# Patient Record
Sex: MALE | Race: WHITE | Employment: FULL TIME | URBAN - METROPOLITAN AREA
[De-identification: names, ages, dates, MRNs, and addresses within clinical notes are randomized per-mention and may not be internally consistent; named-entity substitution may affect disease eponyms.]

---

## 2018-08-15 ENCOUNTER — MED REC SCAN ONLY (OUTPATIENT)
Dept: INTERNAL MEDICINE CLINIC | Facility: CLINIC | Age: 68
End: 2018-08-15

## 2021-04-21 PROBLEM — I49.01 VENTRICULAR FIBRILLATION (HCC): Status: RESOLVED | Noted: 2021-04-21 | Resolved: 2021-04-21

## 2021-04-21 PROBLEM — I49.01 VENTRICULAR FIBRILLATION (HCC): Status: ACTIVE | Noted: 2021-04-21

## 2021-04-21 PROBLEM — I10 BENIGN ESSENTIAL HTN: Status: ACTIVE | Noted: 2021-04-21

## 2021-10-12 ENCOUNTER — HOSPITAL ENCOUNTER (INPATIENT)
Facility: HOSPITAL | Age: 71
LOS: 10 days | Discharge: HOME HEALTH | DRG: 177 | End: 2021-10-22
Attending: EMERGENCY MEDICINE | Admitting: INTERNAL MEDICINE
Payer: MEDICARE

## 2021-10-12 ENCOUNTER — APPOINTMENT (OUTPATIENT)
Dept: GENERAL RADIOLOGY | Facility: HOSPITAL | Age: 71
DRG: 177 | End: 2021-10-12
Attending: EMERGENCY MEDICINE
Payer: MEDICARE

## 2021-10-12 ENCOUNTER — APPOINTMENT (OUTPATIENT)
Dept: CT IMAGING | Facility: HOSPITAL | Age: 71
DRG: 177 | End: 2021-10-12
Attending: EMERGENCY MEDICINE
Payer: MEDICARE

## 2021-10-12 DIAGNOSIS — R77.8 ELEVATED TROPONIN I LEVEL: ICD-10-CM

## 2021-10-12 DIAGNOSIS — J12.82 PNEUMONIA DUE TO COVID-19 VIRUS: Primary | ICD-10-CM

## 2021-10-12 DIAGNOSIS — R09.02 HYPOXIA: ICD-10-CM

## 2021-10-12 DIAGNOSIS — U07.1 PNEUMONIA DUE TO COVID-19 VIRUS: Primary | ICD-10-CM

## 2021-10-12 DIAGNOSIS — E87.6 HYPOKALEMIA: ICD-10-CM

## 2021-10-12 DIAGNOSIS — E11.9 NEW ONSET TYPE 2 DIABETES MELLITUS (HCC): ICD-10-CM

## 2021-10-12 DIAGNOSIS — E87.1 HYPONATREMIA: ICD-10-CM

## 2021-10-12 PROCEDURE — 81001 URINALYSIS AUTO W/SCOPE: CPT | Performed by: EMERGENCY MEDICINE

## 2021-10-12 PROCEDURE — 87040 BLOOD CULTURE FOR BACTERIA: CPT | Performed by: EMERGENCY MEDICINE

## 2021-10-12 PROCEDURE — 71275 CT ANGIOGRAPHY CHEST: CPT | Performed by: EMERGENCY MEDICINE

## 2021-10-12 PROCEDURE — 83520 IMMUNOASSAY QUANT NOS NONAB: CPT | Performed by: INTERNAL MEDICINE

## 2021-10-12 PROCEDURE — 85025 COMPLETE CBC W/AUTO DIFF WBC: CPT | Performed by: EMERGENCY MEDICINE

## 2021-10-12 PROCEDURE — 96375 TX/PRO/DX INJ NEW DRUG ADDON: CPT

## 2021-10-12 PROCEDURE — 94640 AIRWAY INHALATION TREATMENT: CPT

## 2021-10-12 PROCEDURE — 99285 EMERGENCY DEPT VISIT HI MDM: CPT

## 2021-10-12 PROCEDURE — 82550 ASSAY OF CK (CPK): CPT | Performed by: EMERGENCY MEDICINE

## 2021-10-12 PROCEDURE — 84145 PROCALCITONIN (PCT): CPT | Performed by: EMERGENCY MEDICINE

## 2021-10-12 PROCEDURE — 83615 LACTATE (LD) (LDH) ENZYME: CPT | Performed by: EMERGENCY MEDICINE

## 2021-10-12 PROCEDURE — 93005 ELECTROCARDIOGRAM TRACING: CPT

## 2021-10-12 PROCEDURE — 84484 ASSAY OF TROPONIN QUANT: CPT | Performed by: EMERGENCY MEDICINE

## 2021-10-12 PROCEDURE — 85379 FIBRIN DEGRADATION QUANT: CPT | Performed by: EMERGENCY MEDICINE

## 2021-10-12 PROCEDURE — 71045 X-RAY EXAM CHEST 1 VIEW: CPT | Performed by: EMERGENCY MEDICINE

## 2021-10-12 PROCEDURE — 87205 SMEAR GRAM STAIN: CPT | Performed by: EMERGENCY MEDICINE

## 2021-10-12 PROCEDURE — 86140 C-REACTIVE PROTEIN: CPT | Performed by: EMERGENCY MEDICINE

## 2021-10-12 PROCEDURE — 87493 C DIFF AMPLIFIED PROBE: CPT | Performed by: INTERNAL MEDICINE

## 2021-10-12 PROCEDURE — 3E0333Z INTRODUCTION OF ANTI-INFLAMMATORY INTO PERIPHERAL VEIN, PERCUTANEOUS APPROACH: ICD-10-PCS | Performed by: INTERNAL MEDICINE

## 2021-10-12 PROCEDURE — 93010 ELECTROCARDIOGRAM REPORT: CPT

## 2021-10-12 PROCEDURE — 83880 ASSAY OF NATRIURETIC PEPTIDE: CPT | Performed by: EMERGENCY MEDICINE

## 2021-10-12 PROCEDURE — 96365 THER/PROPH/DIAG IV INF INIT: CPT

## 2021-10-12 PROCEDURE — 36415 COLL VENOUS BLD VENIPUNCTURE: CPT

## 2021-10-12 PROCEDURE — XW033E5 INTRODUCTION OF REMDESIVIR ANTI-INFECTIVE INTO PERIPHERAL VEIN, PERCUTANEOUS APPROACH, NEW TECHNOLOGY GROUP 5: ICD-10-PCS | Performed by: INTERNAL MEDICINE

## 2021-10-12 PROCEDURE — 82728 ASSAY OF FERRITIN: CPT | Performed by: EMERGENCY MEDICINE

## 2021-10-12 PROCEDURE — 80053 COMPREHEN METABOLIC PANEL: CPT | Performed by: EMERGENCY MEDICINE

## 2021-10-12 PROCEDURE — 83036 HEMOGLOBIN GLYCOSYLATED A1C: CPT | Performed by: INTERNAL MEDICINE

## 2021-10-12 RX ORDER — GUAIFENESIN 600 MG
600 TABLET, EXTENDED RELEASE 12 HR ORAL 2 TIMES DAILY
Status: DISCONTINUED | OUTPATIENT
Start: 2021-10-12 | End: 2021-10-12

## 2021-10-12 RX ORDER — ACETAMINOPHEN 325 MG/1
650 TABLET ORAL EVERY 6 HOURS PRN
Status: DISCONTINUED | OUTPATIENT
Start: 2021-10-12 | End: 2021-10-22

## 2021-10-12 RX ORDER — HEPARIN SODIUM 5000 [USP'U]/ML
5000 INJECTION, SOLUTION INTRAVENOUS; SUBCUTANEOUS EVERY 8 HOURS SCHEDULED
Status: DISCONTINUED | OUTPATIENT
Start: 2021-10-12 | End: 2021-10-16

## 2021-10-12 RX ORDER — POTASSIUM CHLORIDE 20 MEQ/1
40 TABLET, EXTENDED RELEASE ORAL ONCE
Status: COMPLETED | OUTPATIENT
Start: 2021-10-12 | End: 2021-10-12

## 2021-10-12 RX ORDER — ALBUTEROL SULFATE 90 UG/1
4 AEROSOL, METERED RESPIRATORY (INHALATION) EVERY 4 HOURS PRN
Status: DISCONTINUED | OUTPATIENT
Start: 2021-10-12 | End: 2021-10-22

## 2021-10-12 RX ORDER — ATORVASTATIN CALCIUM 80 MG/1
80 TABLET, FILM COATED ORAL NIGHTLY
Status: DISCONTINUED | OUTPATIENT
Start: 2021-10-12 | End: 2021-10-22

## 2021-10-12 RX ORDER — ATORVASTATIN CALCIUM 80 MG/1
80 TABLET, FILM COATED ORAL NIGHTLY
COMMUNITY
Start: 2021-09-24

## 2021-10-12 RX ORDER — ASPIRIN 81 MG/1
81 TABLET ORAL DAILY
Status: DISCONTINUED | OUTPATIENT
Start: 2021-10-12 | End: 2021-10-22

## 2021-10-12 RX ORDER — ALBUTEROL SULFATE 90 UG/1
8 AEROSOL, METERED RESPIRATORY (INHALATION) 4 TIMES DAILY
Status: DISCONTINUED | OUTPATIENT
Start: 2021-10-12 | End: 2021-10-22

## 2021-10-12 RX ORDER — LISINOPRIL 5 MG/1
5 TABLET ORAL DAILY
Status: DISCONTINUED | OUTPATIENT
Start: 2021-10-12 | End: 2021-10-22

## 2021-10-12 RX ORDER — CITALOPRAM 40 MG/1
40 TABLET ORAL DAILY
COMMUNITY
Start: 2021-09-24 | End: 2022-01-11

## 2021-10-12 RX ORDER — LISINOPRIL 5 MG/1
5 TABLET ORAL DAILY
COMMUNITY
Start: 2021-09-14 | End: 2022-01-11

## 2021-10-12 RX ORDER — GUAIFENESIN 600 MG
600 TABLET, EXTENDED RELEASE 12 HR ORAL 2 TIMES DAILY
Status: DISCONTINUED | OUTPATIENT
Start: 2021-10-12 | End: 2021-10-22

## 2021-10-12 RX ORDER — ESCITALOPRAM OXALATE 20 MG/1
20 TABLET ORAL DAILY
Status: DISCONTINUED | OUTPATIENT
Start: 2021-10-12 | End: 2021-10-22

## 2021-10-12 RX ORDER — ALBUTEROL SULFATE 90 UG/1
4 AEROSOL, METERED RESPIRATORY (INHALATION) EVERY 4 HOURS PRN
Status: DISCONTINUED | OUTPATIENT
Start: 2021-10-12 | End: 2021-10-12

## 2021-10-12 RX ORDER — DEXAMETHASONE SODIUM PHOSPHATE 10 MG/ML
10 INJECTION, SOLUTION INTRAMUSCULAR; INTRAVENOUS ONCE
Status: COMPLETED | OUTPATIENT
Start: 2021-10-12 | End: 2021-10-12

## 2021-10-12 NOTE — ED INITIAL ASSESSMENT (HPI)
Patient with cough, fever and SOB started 11 days ago. Patient is unvaccinated and not covid tested prior to coming to ER.

## 2021-10-12 NOTE — ED QUICK NOTES
Orders for admission, patient is aware of plan and ready to go upstairs. Any questions, please call ED RN Sera Parks at [phone]    Vaccinated?  NO  COVID POSITIVE      Titratable drug(s) infusing: None  Rate: Remdesevir @250, may be done by the time he's ready to g

## 2021-10-12 NOTE — H&P
General Medicine H&P     Patient presents with:  Covid       PCP: Arelis Solomon MD    History of Present Illness: Patient is a 79year old male with PMH including but not limited to HTN, CAD, HL who p/t [facility] ED c sob AND [id number].      Pt reports having sxs fo NT/ND, Bowel sounds normal. No masses,  No organomegaly. Extremities/MSK: Extremities normal/normal movement, atraumatic, no cyanosis  or edema. Skin: Skin color, texture, turgor normal. No rashes or lesions.     Neurologic: Moving all extremities spon Saskia Tatum MD on 10/12/2021 at 12:49 PM     Finalized by (CST): Saskia Tatum MD on 10/12/2021 at 12:50 PM            ASSESSMENT / PLAN:    79year old male with PMH including but not limited to HTN, CAD, HL who p/t [facility] ED c sob AND [id number].      # Acut

## 2021-10-12 NOTE — ED QUICK NOTES
Spoke with Korin Chau from [employer]. Pt iv in right arm not flushing. Korin Chau placed second iv in left arm. RN notified.

## 2021-10-12 NOTE — CONSULTS
INFECTIOUS DISEASE CONSULTATION    Leonardo Day Patient Status:  Emergency    10/30/1950 MRN [id number]   Location [facility] Attending Alber Sousa MD   Hosp Day # 0 PCP tablet, Rfl: 0  LISINOPRIL 5 MG Oral Tab, Take 1 tablet by mouth once daily, Disp: 90 tablet, Rfl: 0  BRILINTA 90 MG Oral Tab, Take 1 tablet (90 mg total) by mouth 2 (two) times daily. , Disp: 180 tablet, Rfl: 3  Ergocalciferol (VITAMIN D2 OR), Take 1-2 cap Creatinine Kinase  Recent Labs   Lab 10/12/21  1255          Inflammatory Markers  Recent Labs   Lab 10/12/21  1255   CRP 15.30*   ZACK 4,196.7*   *   DDIMER 1.71*           Lab Results   Component Value Date    DDIMER 1.71 10/12/2021

## 2021-10-12 NOTE — ED PROVIDER NOTES
Patient Seen in: [facility] Emergency Department      History   Patient presents with:  Covid    Stated Complaint: COVID r/o    Subjective:   HPI    70-year-old male presents to the emergency department with increasing shortness of breath.   Patient (37.1 °C)   Temp src Temporal   SpO2 97 %   O2 Device Nasal cannula       Current:/59   Pulse 77   Temp 98.8 °F (37.1 °C) (Temporal)   Resp 22   Ht 170.2 cm (5' 7\")   Wt 95.3 kg   SpO2 93%   BMI 32.89 kg/m²         Physical Exam  Vitals and nursing All other components within normal limits   LDH - Abnormal; Notable for the following components:     (*)     All other components within normal limits   C-REACTIVE PROTEIN - Abnormal; Notable for the following components:    C-Reactive Protein 15. 3 Abnormal            Final result                 Please view results for these tests on the individual orders.    SCAN SLIDE   TROPONIN I   URINALYSIS WITH CULTURE REFLEX   HEMOGLOBIN A1C   BLOOD CULTURE   BLOOD CULTURE   SPUTUM CULTURE     EKG    Rat pneumonia. 2. COPD with bilateral subpleural fibrosis and mild honeycombing. 3. Mediastinal and right hilar lymphadenopathy, likely reactive. 4. Extensive coronary artery calcium. 5. No pulmonary embolism. 6. Small hiatal hernia.    Dictated by (CST): Isreal noted to have an elevated troponin that is only mildly elevated but no acute EKG changes and I feel this is most likely related to his Covid.   He does have an elevated D-dimer which is most likely also related to Covid but in light of the fact that he is a

## 2021-10-13 PROCEDURE — 82962 GLUCOSE BLOOD TEST: CPT

## 2021-10-13 PROCEDURE — 86140 C-REACTIVE PROTEIN: CPT | Performed by: INTERNAL MEDICINE

## 2021-10-13 PROCEDURE — 85379 FIBRIN DEGRADATION QUANT: CPT | Performed by: INTERNAL MEDICINE

## 2021-10-13 PROCEDURE — 85025 COMPLETE CBC W/AUTO DIFF WBC: CPT | Performed by: INTERNAL MEDICINE

## 2021-10-13 PROCEDURE — 83615 LACTATE (LD) (LDH) ENZYME: CPT | Performed by: INTERNAL MEDICINE

## 2021-10-13 PROCEDURE — 82728 ASSAY OF FERRITIN: CPT | Performed by: INTERNAL MEDICINE

## 2021-10-13 PROCEDURE — 83735 ASSAY OF MAGNESIUM: CPT | Performed by: INTERNAL MEDICINE

## 2021-10-13 PROCEDURE — 80053 COMPREHEN METABOLIC PANEL: CPT | Performed by: INTERNAL MEDICINE

## 2021-10-13 RX ORDER — DEXTROSE MONOHYDRATE 25 G/50ML
50 INJECTION, SOLUTION INTRAVENOUS
Status: DISCONTINUED | OUTPATIENT
Start: 2021-10-13 | End: 2021-10-18

## 2021-10-13 NOTE — PROGRESS NOTES
[facility] Hospitalist Progress Note     PCP: Jada Wen MD    CC:  Follow up    SUBJECTIVE:  Sitting up in chair, eating lunch. On 4L NC.  hetal RN, was weaned down to RA today but walked to restroom, desatted to 76%-- and required up to 7L NC for recovery. aspirin  81 mg Oral Daily   • ticagrelor  90 mg Oral BID   • atorvastatin  80 mg Oral Nightly   • lisinopril  5 mg Oral Daily   • metoprolol tartrate  25 mg Oral BID   • escitalopram  20 mg Oral Daily   • Heparin Sodium (Porcine)  5,000 Units Subcutaneous

## 2021-10-13 NOTE — PLAN OF CARE
NURSING ADMISSION NOTE      Patient admitted via Cart at 5. Pt arrived to unit at change of shift. Received pt on 5L oxygen sat 92-95%. Oriented to room. Safety precautions initiated. Bed in low position. Call light in reach.  Discussed poc with

## 2021-10-13 NOTE — PROGRESS NOTES
[facility]                INFECTIOUS DISEASE PROGRESS NOTE    Bernard Lozada Patient Status:  Inpatient    10/30/1950 MRN [id number]   [facility] 5NW-A Attending Benita Case, *   Hosp Day # 1 PCP Asher Tavarez MD     An results found for: [facility]  Microbiology    No results found for this visit on 10/12/21.       Radiology  CTA no PE    Problem list reviewed:  Patient Active Problem List:     Anxiety     High cholesterol     Coronary artery disease involving native coronary

## 2021-10-13 NOTE — PLAN OF CARE
Assumed care of patient at 0700.  +covid -> droplet/ contact isolation. Wean oxygen as tolerated. A1C 9.3-> Dr. Jo Ann Machado notified and ordered insulin coverage. RN educated patient on diabetes.        Problem: Patient/Family Goals  Goal: Patient/Family

## 2021-10-13 NOTE — DIETARY NOTE
[facility]     Admitting diagnosis:  Hypokalemia [E87.6]  Hyponatremia [E87.1]  Hypoxia [R09.02]  Elevated troponin I level [R77.8]  Pneumonia due to COVID-19 virus [U07.1, J12.82]    Ht: 170.2 cm (5' 7\")  Wt: 9

## 2021-10-13 NOTE — PAYOR COMM NOTE
--------------  ADMISSION REVIEW     Payor: [facility] MEDICARE ADV PPO  Subscriber #:  [id number]  Authorization Number: [id number]         H&P - H&P Note           General Medicine H&P     Patient presents with:  Che       PCP: Ibeth Aaron MD    History of 10/12/2021    HCT 36.2 10/12/2021    .0 10/12/2021    CREATSERUM 0.93 10/12/2021    BUN 18 10/12/2021     10/12/2021    K 3.4 10/12/2021     10/12/2021    CO2 25.0 10/12/2021     10/12/2021    CA 8.4 10/12/2021    ALB 2.6 10/12/20 Unvaccinated  - RDV although starting later in sxs onset  - decadron given hypoxia  - follow inflammatory markers  - prone as able   - CXR noted    # CAD  # htn  - ASA, BRILINTA  - bb, ACE    # HL  -statin    # Major Depression  -reviewed home meds, contin escitalopram (LEXAPRO) tab 20 mg     Date Action Dose Route User    10/13/2021 1010 Given 20 mg Oral Lolita Krishna, ALEN      guaiFENesin ER (MUCINEX) 12 hr tab 600 mg     Date Action Dose Route User    10/13/2021 1010 Given 600 mg Oral Rylie Whipple RN (37.1 °C) 70 18 145/61 97 % — Nasal cannula 4 L/min

## 2021-10-14 PROCEDURE — 85379 FIBRIN DEGRADATION QUANT: CPT | Performed by: INTERNAL MEDICINE

## 2021-10-14 PROCEDURE — 85025 COMPLETE CBC W/AUTO DIFF WBC: CPT | Performed by: INTERNAL MEDICINE

## 2021-10-14 PROCEDURE — 82962 GLUCOSE BLOOD TEST: CPT

## 2021-10-14 PROCEDURE — 86140 C-REACTIVE PROTEIN: CPT | Performed by: INTERNAL MEDICINE

## 2021-10-14 PROCEDURE — 83615 LACTATE (LD) (LDH) ENZYME: CPT | Performed by: INTERNAL MEDICINE

## 2021-10-14 PROCEDURE — 80053 COMPREHEN METABOLIC PANEL: CPT | Performed by: INTERNAL MEDICINE

## 2021-10-14 PROCEDURE — 82728 ASSAY OF FERRITIN: CPT | Performed by: INTERNAL MEDICINE

## 2021-10-14 NOTE — PLAN OF CARE
COVID-19 Daily Discharge Readiness-Nursing    O2 Sat at Rest:  SPO2% on Room Air at Rest: 88  %   O2 Sat with Exertion:    76% on  15  liters  With ambulation to bathroom  Temperature max from last 24 hrs: Temp (24hrs), Av.2 °F (36.8 °C), Min:97.6 °F (

## 2021-10-14 NOTE — PROGRESS NOTES
[facility] Hospitalist Progress Note     PCP: Yoli Andersen MD    CC:  Follow up    SUBJECTIVE:  Sitting up in bed, eating lunch. On 4L ASPEN Sotelo     OBJECTIVE:  Temp:  [97.8 °F (36.6 °C)-98.7 °F (37.1 °C)] 98 °F (36.7 °C)  Pulse:  [65-77] 67  Resp:  [16-20] 18  B albuterol  8 puff Inhalation QID   • remdesivir  100 mg Intravenous Q24H   • aspirin  81 mg Oral Daily   • ticagrelor  90 mg Oral BID   • atorvastatin  80 mg Oral Nightly   • lisinopril  5 mg Oral Daily   • metoprolol tartrate  25 mg Oral BID   • escitalop

## 2021-10-14 NOTE — PLAN OF CARE
Alert x 4. On 5 liters high flow O2. NSR on tele. Voids and continent. Denies pain. QID sugars, no fluids on. Eating well. Here for COVID, on Decadron and Remdez. ID consulted. Still weak on feet. Vitals are stable. MD's still to round on pt.  Needs are jossue

## 2021-10-14 NOTE — PROGRESS NOTES
[facility]                INFECTIOUS DISEASE PROGRESS NOTE    Hurley Blow Patient Status:  Inpatient    10/30/1950 MRN [id number]   [facility] 5NW-A Attending Tushar Canela, *   Hosp Day # 2 PCP Alexandria Smiley MD     An 109   CO2 25.0 22.0 22.0   ALKPHO 65 68 69   AST 87* 110* 102*   ALT 65* 85* 99*   BILT 0.8 0.7 0.7   TP 7.1 7.0 6.6       No results found for: [facility] Encounter on 10/12/21   1.  Blood Culture     Status: None (Preliminary result)

## 2021-10-14 NOTE — CM/SW NOTE
10/14/21 1600   CM/SW Referral Data   Referral Source Social Work (self-referral)   Reason for Referral Discharge planning   Patient Info   Patient's Current Mental Status at Time of Assessment Alert;Oriented   Patient's Home Environment Condo/[address]

## 2021-10-15 PROCEDURE — 82728 ASSAY OF FERRITIN: CPT | Performed by: INTERNAL MEDICINE

## 2021-10-15 PROCEDURE — 82962 GLUCOSE BLOOD TEST: CPT

## 2021-10-15 PROCEDURE — 85025 COMPLETE CBC W/AUTO DIFF WBC: CPT | Performed by: INTERNAL MEDICINE

## 2021-10-15 PROCEDURE — 86140 C-REACTIVE PROTEIN: CPT | Performed by: INTERNAL MEDICINE

## 2021-10-15 PROCEDURE — 80053 COMPREHEN METABOLIC PANEL: CPT | Performed by: INTERNAL MEDICINE

## 2021-10-15 PROCEDURE — 83615 LACTATE (LD) (LDH) ENZYME: CPT | Performed by: INTERNAL MEDICINE

## 2021-10-15 PROCEDURE — 85379 FIBRIN DEGRADATION QUANT: CPT | Performed by: INTERNAL MEDICINE

## 2021-10-15 RX ORDER — ECHINACEA PURPUREA EXTRACT 125 MG
1 TABLET ORAL
Status: DISCONTINUED | OUTPATIENT
Start: 2021-10-15 | End: 2021-10-22

## 2021-10-15 NOTE — PROGRESS NOTES
[facility]                INFECTIOUS DISEASE PROGRESS NOTE    Jose Carlos Ballard Patient Status:  Inpatient    10/30/1950 MRN [id number]   [facility] 5NW-A Attending Chay Morris, *   Hosp Day # 3 PCP Natalia Gonsales MD     An 109 112   CO2 22.0 22.0 22.0   ALKPHO 68 69 81   * 102* 85*   ALT 85* 99* 104*   BILT 0.7 0.7 0.7   TP 7.0 6.6 6.3*       No results found for: [facility] Encounter on 10/12/21   1.  Blood Culture     Status: None (Preliminary re

## 2021-10-15 NOTE — PROGRESS NOTES
[facility] Hospitalist Progress Note     PCP: Yudith Paredes MD    CC:  Follow up    SUBJECTIVE:  Sitting up in bed, eating lunch. On 5L NC. 15L on exertion. Feels nasal congestion, asked about suctioning it out.     OBJECTIVE:  Temp:  [97.8 °F (36.6 °C)-99.8 10/12/21  1415 10/12/21  1806   TROP 0.064* 0.061* 0.059*          Meds:     • insulin detemir  10 Units Subcutaneous Nightly   • Insulin Aspart Pen  1-5 Units Subcutaneous TID CC and HS   • dexamethasone  6 mg Oral Daily   • albuterol  8 puff Inhalation Q Thank Lyssa Metcalf, [facility]  Internal Medicine  Answering Service number: [phone]

## 2021-10-15 NOTE — COVID NURSING ASSESSMENT
COVID-19 Daily Discharge Readiness-Nursing    O2 Sat at Rest:  SPO2% on Room Air at Rest: 88  %   O2 Sat with Exertion:    % on    liters   Temperature max from last 24 hrs: Temp (24hrs), Av.2 °F (36.8 °C), Min:97.8 °F (36.6 °C), Max:99.5 °F (37.5 °C)

## 2021-10-15 NOTE — PLAN OF CARE
COVID-19 Daily Discharge Readiness-Nursing    O2 Sat at Rest:  93  % 5 L via the NC   O2 Sat with Exertion:   90 % on   15 liters   Temperature max from last 24 hrs: Temp (24hrs), Av.6 °F (37 °C), Min:97.8 °F (36.6 °C), Max:99.8 °F (37.7 °C)    Inflamm oxygen saturation or ABGs  - Provide Smoking Cessation handout, if applicable  - Encourage broncho-pulmonary hygiene including cough, deep breathe, Incentive Spirometry  - Assess the need for suctioning and perform as needed  - Assess and instruct to repor

## 2021-10-16 ENCOUNTER — APPOINTMENT (OUTPATIENT)
Dept: CT IMAGING | Facility: HOSPITAL | Age: 71
DRG: 177 | End: 2021-10-16
Attending: HOSPITALIST
Payer: MEDICARE

## 2021-10-16 PROCEDURE — 82962 GLUCOSE BLOOD TEST: CPT

## 2021-10-16 PROCEDURE — 80053 COMPREHEN METABOLIC PANEL: CPT | Performed by: INTERNAL MEDICINE

## 2021-10-16 PROCEDURE — 71275 CT ANGIOGRAPHY CHEST: CPT | Performed by: HOSPITALIST

## 2021-10-16 PROCEDURE — 85379 FIBRIN DEGRADATION QUANT: CPT | Performed by: HOSPITALIST

## 2021-10-16 RX ORDER — ENOXAPARIN SODIUM 100 MG/ML
1 INJECTION SUBCUTANEOUS EVERY 12 HOURS SCHEDULED
Status: DISCONTINUED | OUTPATIENT
Start: 2021-10-16 | End: 2021-10-22

## 2021-10-16 RX ORDER — ENOXAPARIN SODIUM 100 MG/ML
0.5 INJECTION SUBCUTANEOUS EVERY 12 HOURS SCHEDULED
Status: DISCONTINUED | OUTPATIENT
Start: 2021-10-16 | End: 2021-10-16

## 2021-10-16 NOTE — PROGRESS NOTES
COVID-19 Daily Discharge Readiness-Nursing    O2 Sat at Rest:  SPO2% on Room Air at Rest: 88  %   O2 Sat with Exertion: 92 % on 10 liters (to bathroom)  Temperature max from last 24 hrs: Temp (24hrs), Av.5 °F (36.9 °C), Min:97.8 °F (36.6 °C), Max:99.8

## 2021-10-16 NOTE — PROGRESS NOTES
[facility]                INFECTIOUS DISEASE PROGRESS NOTE    Brooke Santos Patient Status:  Inpatient    10/30/1950 MRN [id number]   [facility] 5NW-A Attending Arnaldo Chau, *   Hosp Day # 4 PCP Maile Johnson MD     An GFRAA 102 103 102   GFRNAA 88 89 88   CA 8.3* 8.3* 8.6   ALB 2.1* 2.0* 2.3*    142 138   K 3.9 4.0 4.1    112 108   CO2 22.0 22.0 25.0   ALKPHO 69 81 100   * 85* 89*   ALT 99* 104* 112*   BILT 0.7 0.7 1.0   TP 6.6 6.3* 6.8       No res

## 2021-10-16 NOTE — PLAN OF CARE
Problem: Patient/Family Goals  Goal: Patient/Family Long Term Goal  Description: Patient's Long Term Goal: DC    Interventions:  - MD orders  - See additional Care Plan goals for specific interventions  Outcome: Progressing  Goal: Patient/Family Short Te

## 2021-10-16 NOTE — PROGRESS NOTES
[facility] Hospitalist Progress Note     PCP: Shante Atkinson MD    CC:  Follow up    SUBJECTIVE:  Laying in bed, still sob with exertion. On 5L NC. 10-15L on exertion.     OBJECTIVE:  Temp:  [97.8 °F (36.6 °C)-99.9 °F (37.7 °C)] 99.9 °F (37.7 °C)  Pulse:  [51 130*       Recent Labs   Lab 10/12/21  1255 10/12/21  1415 10/12/21  1806   TROP 0.064* 0.061* 0.059*          COVID-19 Lab Results    COVID-19  Lab Results   Component Value Date    COVID19 Detected (A) 10/12/2021       Pro-Calcitonin  Recent Labs   Lab 1 needed    -O2 requirements remain same for the past few days. ddimer rising >5x ULN. Based on 10/7/21 TINO study, will change AC to therapeutic lovenox.   \"the data support that therapeutic anticoagulation with LMWH or UFH is associated with improved outc

## 2021-10-17 PROCEDURE — 83615 LACTATE (LD) (LDH) ENZYME: CPT | Performed by: HOSPITALIST

## 2021-10-17 PROCEDURE — 84145 PROCALCITONIN (PCT): CPT | Performed by: INTERNAL MEDICINE

## 2021-10-17 PROCEDURE — 82728 ASSAY OF FERRITIN: CPT | Performed by: HOSPITALIST

## 2021-10-17 PROCEDURE — 82962 GLUCOSE BLOOD TEST: CPT

## 2021-10-17 PROCEDURE — 80053 COMPREHEN METABOLIC PANEL: CPT | Performed by: INTERNAL MEDICINE

## 2021-10-17 PROCEDURE — 85025 COMPLETE CBC W/AUTO DIFF WBC: CPT | Performed by: HOSPITALIST

## 2021-10-17 PROCEDURE — XW0DXM6 INTRODUCTION OF BARICITINIB INTO MOUTH AND PHARYNX, EXTERNAL APPROACH, NEW TECHNOLOGY GROUP 6: ICD-10-PCS | Performed by: INTERNAL MEDICINE

## 2021-10-17 PROCEDURE — 86140 C-REACTIVE PROTEIN: CPT | Performed by: HOSPITALIST

## 2021-10-17 PROCEDURE — 85379 FIBRIN DEGRADATION QUANT: CPT | Performed by: HOSPITALIST

## 2021-10-17 NOTE — CONSULTS
Pulmonary Consult     Assessment / Plan:  1. Acute hypoxemic respiratory failure   -in the setting of COVID-19 pneumonia   -on 10L HFNC  -wean as tolerated   -IS use, awake prone   2.  COVID 19 pneumonia   -unvaccinated   -completed remdesivir today   -cont 10/12/2021 at 0900  Omega-3 Fatty Acids (OMEGA 3 500 OR), Take 1,000 mg by mouth daily. , Disp: , Rfl: , 10/12/2021 at 0900  aspirin 81 MG Oral Tab, Take 81 mg by mouth daily. , Disp: , Rfl: , 10/12/2021 at 0900      atorvastatin 80 MG Oral Tab, Take 80 mg b regular rate and rhythm, no murmurs / rubs / gallops  Lungs: clear bilaterally, normal respiratory effort, no accessory muscle use  Extremities: no edema or cyanosis  Psych: interactive, answering questions appropriately, appropriate affect    Labs:  Mariluz

## 2021-10-17 NOTE — PROGRESS NOTES
[facility] Hospitalist Progress Note     PCP: Gio Caldera MD    CC:  Follow up    SUBJECTIVE:  Asleep on L side. Appears comfortable.  Per RN, requiring up to 15L NC on exertion and is 8-10L at rest.    OBJECTIVE:  Temp:  [97.7 °F (36.5 °C)-98.5 °F (36.9 ° 0.061* 0.059*          COVID-19 Lab Results    COVID-19  Lab Results   Component Value Date    COVID19 Detected (A) 10/12/2021       Pro-Calcitonin  Recent Labs   Lab 10/12/21  1255   PCT 0.13       Cardiac  Recent Labs   Lab 10/12/21  1255 10/12/21  1415 to therapeutic lovenox. \"the data support that therapeutic anticoagulation with LMWH or UFH is associated with improved outcomes in hospitalized patient with covid-19 who are not critically ill or in the ICU setting. \"  -CTA chest without PE.  Will keep o

## 2021-10-17 NOTE — PROGRESS NOTES
COVID-19 Daily Discharge Readiness-Nursing    O2 Sat at Rest:  SPO2% on Room Air at Rest: 88  %   O2 Sat with Exertion: 92 % on 10  Liters (to bathroom) requiring 15 with O2 walk in room   Temperature max from last 24 hrs: Temp (24hrs), Av.5 °F (36.9 °

## 2021-10-18 PROCEDURE — 85025 COMPLETE CBC W/AUTO DIFF WBC: CPT | Performed by: HOSPITALIST

## 2021-10-18 PROCEDURE — 97116 GAIT TRAINING THERAPY: CPT

## 2021-10-18 PROCEDURE — 80053 COMPREHEN METABOLIC PANEL: CPT | Performed by: HOSPITALIST

## 2021-10-18 PROCEDURE — 82962 GLUCOSE BLOOD TEST: CPT

## 2021-10-18 PROCEDURE — 85379 FIBRIN DEGRADATION QUANT: CPT | Performed by: HOSPITALIST

## 2021-10-18 PROCEDURE — 97535 SELF CARE MNGMENT TRAINING: CPT

## 2021-10-18 PROCEDURE — 83615 LACTATE (LD) (LDH) ENZYME: CPT | Performed by: HOSPITALIST

## 2021-10-18 PROCEDURE — 97166 OT EVAL MOD COMPLEX 45 MIN: CPT

## 2021-10-18 PROCEDURE — 86140 C-REACTIVE PROTEIN: CPT | Performed by: HOSPITALIST

## 2021-10-18 PROCEDURE — 97161 PT EVAL LOW COMPLEX 20 MIN: CPT

## 2021-10-18 PROCEDURE — 82728 ASSAY OF FERRITIN: CPT | Performed by: HOSPITALIST

## 2021-10-18 RX ORDER — DEXTROSE MONOHYDRATE 25 G/50ML
50 INJECTION, SOLUTION INTRAVENOUS
Status: DISCONTINUED | OUTPATIENT
Start: 2021-10-18 | End: 2021-10-19

## 2021-10-18 NOTE — PLAN OF CARE
COVID-19 Daily Discharge Readiness-Nursing    O2 Sat at Rest:  94% on 8-10L NC   O2 Sat with Exertion:    % on    liters   Temperature max from last 24 hrs: Temp (24hrs), Av °F (37.2 °C), Min:98.4 °F (36.9 °C), Max:100.1 °F (37.8 °C)    Inflammatory Ma Term Goal  Description: Patient's Short Term Goal: Improved breathing  10/15: wean o2 with ambulation   10/17(noc) to wean O2   Interventions:   - O2 PRN  - See additional Care Plan goals for specific interventions  Outcome: Progressing     Problem: RESPIR

## 2021-10-18 NOTE — COVID NURSING ASSESSMENT
COVID-19 Daily Discharge Readiness-Nursing    O2 Sat at Rest:  93% on 6L HFNC  O2 Sat with Exertion:    % on  15  liters   Temperature max from last 24 hrs: Temp (24hrs), Av.3 °F (36.8 °C), Min:97.6 °F (36.4 °C), Max:99.5 °F (37.5 °C)    Inflammatory M

## 2021-10-18 NOTE — PHYSICAL THERAPY NOTE
PHYSICAL THERAPY EVALUATION - INPATIENT     Room Number: 625/965-P  Evaluation Date: 10/18/2021  Type of Evaluation: Initial  Physician Order: PT Eval and Treat    Presenting Problem: COVID   Reason for Therapy: Mobility Dysfunction and Discharge [facility] PT    PLAN  PT Treatment Plan: Bed mobility; Body mechanics; Coordination; Endurance; Energy conservation;Patient education; Family education;Gait training;Neuromuscular re-educate;Range of motion;Strengthening;Stoop training;Stair training;Transfer mirta WALK  Oxygen Therapy  SPO2% on Oxygen at Rest: 94  At rest oxygen flow (liters per minute): 5  SPO2% Ambulation on Oxygen: 89  Ambulation oxygen flow (liters per minute): 15    NEUROLOGICAL FINDINGS                        AM-PAC '6-Clicks' INPATIENT SHORT session.     Patient/Family PPE: Mask not worn

## 2021-10-18 NOTE — PROGRESS NOTES
S: Pt denies shortness of breath or cough today.      Meds:  • baricitinib  4 mg Oral Daily   • enoxaparin  1 mg/kg Subcutaneous 2 times per day   • insulin detemir  10 Units Subcutaneous Nightly   • Insulin Aspart Pen  1-5 Units Subcutaneous TID CC 60*   ALT 85*   < > 112* 97* 92*   BILT 0.7   < > 1.0 0.9 0.8   TP 7.0   < > 6.8 6.3* 6.5    < > = values in this interval not displayed.        Recent Labs   Lab 10/12/21  1254 10/12/21  1255 10/12/21  1415 10/12/21  1806   TROP  --  0.064* 0.061* 0.059*

## 2021-10-18 NOTE — PROGRESS NOTES
[facility]                INFECTIOUS DISEASE PROGRESS NOTE    Dahlia Ewing Patient Status:  Inpatient    10/30/1950 MRN [id number]   [facility] 5NW-A Attending Mindy Villasenor, *   Hosp Day # 6 PCP Gutierrez Dalton MD     An 26* 28*   CREATSERUM 0.84 0.86 0.84   GFRAA 103 102 103   GFRNAA 89 88 89   CA 8.3* 8.6 8.3*   ALB 2.0* 2.3* 2.0*    138 135*   K 4.0 4.1 4.2    108 107   CO2 22.0 25.0 23.0   ALKPHO 81 100 92   AST 85* 89* 64*   * 112* 97*   BILT 0.7 1.

## 2021-10-18 NOTE — PROGRESS NOTES
[facility] Hospitalist Progress Note     PCP: Beti Johnson MD    CC:  Follow up    SUBJECTIVE:  Doing well has dyspnea with conversation, 6 L saturating 90% , requiring 15 L with ambulation still    OBJECTIVE:  Temp:  [97.6 °F (36.4 °C)-99.5 °F (37.5 °C)] *  --  518* 506*  --  463* 447*    < > = values in this interval not displayed.        Recent Labs   Lab 10/17/21  1219 10/17/21  1648 10/17/21  2027 10/18/21  0741 10/18/21  1115   PGLU 252* 214* 338* 223* 235*       Recent Labs   Lab 10/12/21  12 including but not limited to HTN, CAD, HL who p/t [facility] ED c sob AND [id number].      # Acute hypoxemic respiratory distress 2/2 covid   **rising ddimer  -currently down to 6L , but 10-15L on exertion  - supp o2 as needed    -O2 requirements remain same for the

## 2021-10-19 PROCEDURE — 86140 C-REACTIVE PROTEIN: CPT | Performed by: HOSPITALIST

## 2021-10-19 PROCEDURE — 85379 FIBRIN DEGRADATION QUANT: CPT | Performed by: HOSPITALIST

## 2021-10-19 PROCEDURE — 82962 GLUCOSE BLOOD TEST: CPT

## 2021-10-19 PROCEDURE — 83615 LACTATE (LD) (LDH) ENZYME: CPT | Performed by: HOSPITALIST

## 2021-10-19 PROCEDURE — 82728 ASSAY OF FERRITIN: CPT | Performed by: HOSPITALIST

## 2021-10-19 PROCEDURE — 85027 COMPLETE CBC AUTOMATED: CPT | Performed by: HOSPITALIST

## 2021-10-19 PROCEDURE — 85007 BL SMEAR W/DIFF WBC COUNT: CPT | Performed by: HOSPITALIST

## 2021-10-19 PROCEDURE — 85025 COMPLETE CBC W/AUTO DIFF WBC: CPT | Performed by: HOSPITALIST

## 2021-10-19 RX ORDER — DEXTROSE MONOHYDRATE 25 G/50ML
50 INJECTION, SOLUTION INTRAVENOUS
Status: DISCONTINUED | OUTPATIENT
Start: 2021-10-19 | End: 2021-10-20

## 2021-10-19 NOTE — PROGRESS NOTES
[facility] Hospitalist Progress Note     PCP: Shante Atkinson MD    CC:  Follow up    SUBJECTIVE:  Improved slightly today and now on 3 L of oxygen saturating above 90% with rest however still requiring about 10 to 15 L high flow nasal cannula with ambulation 10/14/21  1480 10/15/21  0727 10/16/21  0820 10/17/21  0620 10/18/21  0736 10/19/21  0715   ALT 99* 104* 112* 97* 92*  --    * 85* 89* 64* 60*  --    ALB 2.1* 2.0* 2.3* 2.0* 2.1*  --    * 506*  --  463* 447* 472*       Recent Labs   Lab 10/18 respiratory failure secondary Covid    -currently down to 3L , but 10-15L on exertion  - supp o2 as needed    -O2 requirements remain same for the past few days. ddimer had risen >5x ULN. Based on 10/7/21 TINO study, will change AC to therapeutic lovenox.

## 2021-10-19 NOTE — PROGRESS NOTES
[facility] Patient Status:  Inpatient    10/30/1950 MRN [id number]   [facility] 5NW-A Attending William Ortega MD   Hosp Day # 7 PCP Natalia Gonsales MD     SUBJECTIVE: Pt states that he is feeling well.      OBJECTIVE ticagrelor (BRILINTA) tab 90 mg, 90 mg, Oral, BID  •  atorvastatin (LIPITOR) tab 80 mg, 80 mg, Oral, Nightly  •  lisinopril tab 5 mg, 5 mg, Oral, Daily  •  metoprolol tartrate (LOPRESSOR) tab 25 mg, 25 mg, Oral, BID  •  escitalopram (LEXAPRO) tab 20 mg, 20 -completed remdesivir   -continue decadron (10/12-)  -continue baricitinib (10/17-)  -ID is following  -d-dimer > 5ULN but CTA chest was negative for PE. Continue LMWH   3. Dispo  -will follow   -repeat ambulatory O2 eval, O2 needs have been improving.

## 2021-10-19 NOTE — COVID NURSING ASSESSMENT
COVID-19 Daily Discharge Readiness-Nursing    O2 Sat at Rest:  SPO2% on Room Air at Rest: 88  %   O2 Sat with Exertion: SPO2% Ambulation on Oxygen: 89  % on Ambulation oxygen flow (liters per minute): 15  liters   Temperature max from last 24 hrs: Temp (24

## 2021-10-19 NOTE — PROGRESS NOTES
[facility]                INFECTIOUS DISEASE PROGRESS NOTE    Geovanna Suero Patient Status:  Inpatient    10/30/1950 MRN [id number]   [facility] 5NW-A Attending Robert Posada, *   Hosp Day # 7 PCP Michelle Khanna MD     An CO2 25.0 23.0 23.0   ALKPHO 100 92 89   AST 89* 64* 60*   * 97* 92*   BILT 1.0 0.9 0.8   TP 6.8 6.3* 6.5       No results found for: [facility] Encounter on 10/12/21   1.  Blood Culture     Status: None    Collection Time: 10/1

## 2021-10-19 NOTE — CM/SW NOTE
Chart reviewed. PT is recommending HHC at discharge. Initiated [facility] referrals in [facility]. Order/F2F entered.  Await [facility] agency acceptances to provide choice list.     Radha Carreon, MSW, LSW   / Discharge Planner

## 2021-10-19 NOTE — PLAN OF CARE
COVID-19 Daily Discharge Readiness-Nursing    O2 Sat at Rest:  91% on 3L HF  O2 Sat with Exertion: SPO2% Ambulation on Oxygen: 89  % on Ambulation oxygen flow (liters per minute): 15  liters   Temperature max from last 24 hrs: Temp (24hrs), Av.7 °F (36 broncho-pulmonary hygiene including cough, deep breathe, Incentive Spirometry  - Assess the need for suctioning and perform as needed  - Assess and instruct to report SOB or any respiratory difficulty  - Respiratory Therapy support as indicated  - Manage/a

## 2021-10-20 PROCEDURE — 85007 BL SMEAR W/DIFF WBC COUNT: CPT | Performed by: HOSPITALIST

## 2021-10-20 PROCEDURE — 82962 GLUCOSE BLOOD TEST: CPT

## 2021-10-20 PROCEDURE — 85025 COMPLETE CBC W/AUTO DIFF WBC: CPT | Performed by: HOSPITALIST

## 2021-10-20 PROCEDURE — 80048 BASIC METABOLIC PNL TOTAL CA: CPT | Performed by: HOSPITALIST

## 2021-10-20 PROCEDURE — 85027 COMPLETE CBC AUTOMATED: CPT | Performed by: HOSPITALIST

## 2021-10-20 RX ORDER — DEXTROSE MONOHYDRATE 25 G/50ML
50 INJECTION, SOLUTION INTRAVENOUS
Status: DISCONTINUED | OUTPATIENT
Start: 2021-10-20 | End: 2021-10-22

## 2021-10-20 NOTE — PLAN OF CARE
COVID-19 Daily Discharge Readiness-Nursing    O2 Sat at Rest:  91% on RA  O2 Sat with Exertion: SPO2% Ambulation on Oxygen: 90  % on Ambulation oxygen flow (liters per minute): 3  liters   Temperature max from last 24 hrs: Temp (24hrs), Av °F (36.7 °C) Patient/Family Goals  Goal: Patient/Family Short Term Goal  Description: Patient's Short Term Goal: Improved breathing  10/15: wean o2 with ambulation   10/18AM: wean O2  10/18 noc: wean o2 as tolerated  10/19AM: wean O2  10/19 noc: wean o2 as tolerated 0319 by Aliza Juarez, RN  Outcome: Progressing  10/20/2021 0313 by Aliza Juarez, RN  Outcome: Progressing

## 2021-10-20 NOTE — PROGRESS NOTES
S: Pt is not coughing much.  He denies shortness of breath     Meds:  • insulin detemir  18 Units Subcutaneous Nightly   • Insulin Aspart Pen  4-20 Units Subcutaneous TID CC and HS   • baricitinib  4 mg Oral Daily   • enoxaparin  1 mg/kg Subcutaneou --    AST 89*  --  64* 60*  --    *  --  97* 92*  --    BILT 1.0  --  0.9 0.8  --    TP 6.8  --  6.3* 6.5  --     < > = values in this interval not displayed. Recent Labs   Lab 10/17/21  0620 10/18/21  0736 10/19/21  0715   ZACK 1,978.0* 1,670.

## 2021-10-20 NOTE — PROGRESS NOTES
[facility] Hospitalist Progress Note     PCP: Alexander Danielson MD    CC:  Follow up    SUBJECTIVE:  Patient doing well today on 3 L of oxygen at rest saturating above 93%-comfortable states that his nose hurts and burns-  Yesterday patient ambulated and requir 10/20/21  0746 10/20/21  1240   PGLU 274* 369* 328* 84 259*       No results for input(s): TROP in the last 168 hours.        COVID-19 Lab Results    COVID-19  Lab Results   Component Value Date    COVID19 Detected (A) 10/12/2021       Pro-Calcitonin  Recen will change AC to therapeutic lovenox. \"the data support that therapeutic anticoagulation with LMWH or UFH is associated with improved outcomes in hospitalized patient with covid-19 who are not critically ill or in the ICU setting. \"  -CTA chest without

## 2021-10-20 NOTE — COVID NURSING ASSESSMENT
COVID-19 Daily Discharge Readiness-Nursing    O2 Sat at Rest:  SPO2% on Room Air at Rest: 0  %   O2 Sat with Exertion: SPO2% Ambulation on Oxygen: 90 (dropped to 87)  % on Ambulation oxygen flow (liters per minute): 15  liters   Temperature max from last 2

## 2021-10-21 PROCEDURE — 82962 GLUCOSE BLOOD TEST: CPT

## 2021-10-21 PROCEDURE — 97110 THERAPEUTIC EXERCISES: CPT

## 2021-10-21 PROCEDURE — 85025 COMPLETE CBC W/AUTO DIFF WBC: CPT | Performed by: HOSPITALIST

## 2021-10-21 PROCEDURE — 85007 BL SMEAR W/DIFF WBC COUNT: CPT | Performed by: HOSPITALIST

## 2021-10-21 PROCEDURE — 80048 BASIC METABOLIC PNL TOTAL CA: CPT | Performed by: HOSPITALIST

## 2021-10-21 PROCEDURE — 97116 GAIT TRAINING THERAPY: CPT

## 2021-10-21 PROCEDURE — 85027 COMPLETE CBC AUTOMATED: CPT | Performed by: HOSPITALIST

## 2021-10-21 NOTE — PLAN OF CARE
Multidisciplinary Discharge Rounds held 10/21/2021. Treatment team members present today include , , Charge Nurse, Nurse, RT, PT and Pharmacy caring for [employer].      Other care providers present:    Mobility Goal: up in c and perform as needed  - Assess and instruct to report SOB or any respiratory difficulty  - Respiratory Therapy support as indicated  - Manage/alleviate anxiety  - Monitor for signs/symptoms of CO2 retention  Outcome: Progressing     Problem: Diabetes/Gluc

## 2021-10-21 NOTE — PROGRESS NOTES
S: Pt is still needing significant amount of O2 with ambulation (15L). He denies shortness of breath or cough.      Meds:  • Insulin Aspart Pen  6 Units Subcutaneous TID CC   • insulin detemir  18 Units Subcutaneous Nightly   • Insulin Aspart Pen  4 ALB 2.3*  --  2.0*  --  2.1*  --   --    CA 8.6   < > 8.3*   < > 8.7 8.6 9.5   ALKPHO 100  --  92  --  89  --   --    AST 89*  --  64*  --  60*  --   --    *  --  97*  --  92*  --   --    BILT 1.0  --  0.9  --  0.8  --   --    TP 6.8  --  6.3*  --

## 2021-10-21 NOTE — PHYSICAL THERAPY NOTE
PHYSICAL THERAPY TREATMENT NOTE - INPATIENT    Room Number: 498/220-T     Session: 1     Number of Visits to Meet Established Goals: 3    Presenting Problem: COVID   History related to current admission: Patient is a 79year old male admitted on 10/12/2 standing. OBJECTIVE  Precautions: None    WEIGHT BEARING RESTRICTION  Weight Bearing Restriction: None                PAIN ASSESSMENT   Ratin  Location: denies pain at this time  Management Techniques:  Activity promotion    BALANCE room, 96% O2sat. Lowered to 3LO2, ambulated, performed standing therex, and held conversation while maintaining O2sat >90%(see O2 walk above). Rn made aware. Pt instructed to cont to amb with nsg staff in order to prevent negative effects of inactivity.

## 2021-10-21 NOTE — HOME CARE LIAISON
Patient provided with list of [facility] providers from [facility], patient choice is [address]. Agency reserved in [facility] and contact information placed on AVS. Financial interest disclosure provided to patient.

## 2021-10-21 NOTE — PROGRESS NOTES
[facility]                INFECTIOUS DISEASE PROGRESS NOTE    Dahlia Ewing Patient Status:  Inpatient    10/30/1950 MRN [id number]   [facility] 5NW-A Attending Mindy Villasenor, *   Hosp Day # 9 PCP Gutierrez Dalton MD     An 0.84   < > 0.85 0.93 1.07   GFRAA 102   < > 103   < > 102 96 81   GFRNAA 88   < > 89   < > 88 83 70   CA 8.6   < > 8.3*   < > 8.7 8.6 9.5   ALB 2.3*  --  2.0*  --  2.1*  --   --       < > 135*   < > 135* 135* 137   K 4.1   < > 4.2   < > 4.5 4.4 4.4 Consultants  [phone]

## 2021-10-21 NOTE — PROGRESS NOTES
10/21/21 1517   Mobility   O2 walk?  Yes   SPO2% on Room Air at Rest 86   SPO2% on Oxygen at Rest 93   At rest oxygen flow (liters per minute) 2   SPO2% Ambulation on Oxygen 90   Ambulation oxygen flow (liters per minute) 2

## 2021-10-21 NOTE — PROGRESS NOTES
[facility] Hospitalist Progress Note     PCP: Citlali Kinsey MD    CC:  Follow up    SUBJECTIVE:  Doing well states that he is breathing much better only on 3 L of oxygen at rest saturating above 90% however with ambulation patient's O2 requirement upwards o 10/20/21  1240 10/20/21  1654 10/20/21  2104 10/21/21  0806 10/21/21  1324   PGLU 259* 236* 227* 121* 187*       No results for input(s): TROP in the last 168 hours.        COVID-19 Lab Results    COVID-19  Lab Results   Component Value Date    COVID19 Dete continue therapeutic Lovenox at this time  -Wean oxygen as tolerated  -Discussed with pulmonology, ID    # Covid 19 PNA  - sxs ~10d, Unvaccinated  - RDV a completed  -Completed Decadron  - follow inflammatory markers  - prone as able   -baritinib  - CXR an

## 2021-10-21 NOTE — COVID NURSING ASSESSMENT
COVID-19 Daily Discharge Readiness-Nursing    O2 Sat at Rest:  SPO2% on Room Air at Rest: 86 %, requiring 2L    O2 Sat with Exertion: SPO2% Ambulation on Oxygen: 90  % on Ambulation oxygen flow (liters per minute): 2  liters   Temperature max from last 24

## 2021-10-22 VITALS
BODY MASS INDEX: 33.9 KG/M2 | HEIGHT: 67 IN | TEMPERATURE: 99 F | HEART RATE: 68 BPM | RESPIRATION RATE: 20 BRPM | OXYGEN SATURATION: 95 % | WEIGHT: 216 LBS | SYSTOLIC BLOOD PRESSURE: 98 MMHG | DIASTOLIC BLOOD PRESSURE: 52 MMHG

## 2021-10-22 PROBLEM — E11.9 NEW ONSET TYPE 2 DIABETES MELLITUS (HCC): Chronic | Status: ACTIVE | Noted: 2021-10-22

## 2021-10-22 PROCEDURE — 80048 BASIC METABOLIC PNL TOTAL CA: CPT | Performed by: HOSPITALIST

## 2021-10-22 PROCEDURE — 90471 IMMUNIZATION ADMIN: CPT

## 2021-10-22 PROCEDURE — 85027 COMPLETE CBC AUTOMATED: CPT | Performed by: HOSPITALIST

## 2021-10-22 PROCEDURE — 85007 BL SMEAR W/DIFF WBC COUNT: CPT | Performed by: HOSPITALIST

## 2021-10-22 PROCEDURE — 82962 GLUCOSE BLOOD TEST: CPT

## 2021-10-22 PROCEDURE — 85025 COMPLETE CBC W/AUTO DIFF WBC: CPT | Performed by: HOSPITALIST

## 2021-10-22 RX ORDER — LANCETS
EACH MISCELLANEOUS
Qty: 100 EACH | Refills: 2 | Status: SHIPPED | OUTPATIENT
Start: 2021-10-22 | End: 2022-04-20

## 2021-10-22 RX ORDER — BLOOD-GLUCOSE METER
1 EACH MISCELLANEOUS
Qty: 1 KIT | Refills: 0 | Status: SHIPPED | OUTPATIENT
Start: 2021-10-22

## 2021-10-22 RX ORDER — PEN NEEDLE, DIABETIC 32GX 5/32"
NEEDLE, DISPOSABLE MISCELLANEOUS
Qty: 100 EACH | Refills: 6 | Status: SHIPPED | OUTPATIENT
Start: 2021-10-22 | End: 2021-12-29

## 2021-10-22 RX ORDER — BLOOD SUGAR DIAGNOSTIC
STRIP MISCELLANEOUS
Qty: 100 STRIP | Refills: 2 | Status: SHIPPED | OUTPATIENT
Start: 2021-10-22

## 2021-10-22 RX ORDER — INSULIN DETEMIR 100 [IU]/ML
14 INJECTION, SOLUTION SUBCUTANEOUS NIGHTLY
Qty: 6 EACH | Refills: 3 | Status: SHIPPED | OUTPATIENT
Start: 2021-10-22 | End: 2021-12-29

## 2021-10-22 NOTE — CONSULTS
[facility]  Diabetes Consult Note    Nadegela Cancel Patient Status:  Inpatient    10/30/1950 MRN [id number]   [facility] 5NW-A Attending Radha Miranda MD   Hosp Day # 8 PCP Katherine Crawley MD     Reason for Consult:     Recommendat reports his mother is the only member of his family that had diabetes. 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   He states he shares a house with a friend who he states has diabetes and he benefits of exercise, recommending walking when he is able to do so, at least 30 minutes per day. He agreed to do this.       Recommended follow-up at the [facility] to make sure he has all medications and address any self-management issues within 2-3 days follow Pharmacy for Med to Bed. Dr. Ari Shafer agreed and the patient stated this would be helpful. · Oral glycemics:  Recommended metformin and Dr. Ari Shafer agreed.  Supplies:  BD pen needles (Jen[employer])  · Glucometer:  [place] Next EZ - with may substitute based o

## 2021-10-22 NOTE — DIETARY NOTE
[facility]     Admitting diagnosis:  Hypokalemia [E87.6]  Hyponatremia [E87.1]  Hypoxia [R09.02]  Elevated troponin I level [R77.8]  Pneumonia due to COVID-19 virus [U07.1, J12.82]    Ht: 170.2 cm (5' 7\")  Wt: 9

## 2021-10-22 NOTE — COVID NURSING ASSESSMENT
COVID-19 Daily Discharge Readiness-Nursing    O2 Sat at Rest:  SPO2% on Room Air at Rest: 86  %   O2 Sat with Exertion: SPO2% Ambulation on Oxygen: 90  % on Ambulation oxygen flow (liters per minute): 2  liters   Temperature max from last 24 hrs: Temp (24h

## 2021-10-22 NOTE — DIETARY NOTE
[facility]     Admitting diagnosis:  Hypokalemia [E87.6]  Hyponatremia [E87.1]  Hypoxia [R09.02]  Elevated troponin I level [R77.8]  Pneumonia due to COVID-19 virus [U07.1, J12.82]    Ht: 170.2 cm (5' 7\")  Wt: 9

## 2021-10-22 NOTE — PROGRESS NOTES
Pulmonary Progress Note        NAME: [facility] - ROOM: [id number] - MRN: [id number] - Age: 79year old - : 10/30/1950        Last 24hrs: No events overnight, denies complaints, feeling good today    OBJECTIVE:   10/22/21  0903 10/22/21  0913 10/22/21 decadron  -baricitinib (10/17-10/20)  -ID is following  -d-dimer > 5ULN but CTA chest was negative for PE.  Continue LMWH   3. Dispo  -stable to dc home from pulm perspective  -should f/u in clinic w/ Dr. Jose Jimenez in 1-2 weeks       [address] [place] [address]

## 2021-10-22 NOTE — PROGRESS NOTES
[facility]                INFECTIOUS DISEASE PROGRESS NOTE    Caroele Brennan Patient Status:  Inpatient    10/30/1950 MRN [id number]   [facility] 5NW-A Attending Geovanny Grossman, [address] Day # 10 PCP Alexander Danielson MD     A 83 70 87   CA 8.6   < > 8.3*   < > 8.7   < > 8.6 9.5 8.8   ALB 2.3*  --  2.0*  --  2.1*  --   --   --   --       < > 135*   < > 135*   < > 135* 137 136   K 4.1   < > 4.2   < > 4.5   < > 4.4 4.4 4.0      < > 107   < > 106   < > 107 105 107   [facility]  [phone]

## 2021-10-22 NOTE — PROGRESS NOTES
NURSING DISCHARGE NOTE    Discharged Home via Wheelchair. Accompanied by Support staff  Belongings Taken by patient/family. Patient assessed and ready for discharge. IV dc'd. C/D/I. Discharge instructions and follow up gone over with patient.  Renate

## 2021-10-22 NOTE — CM/SW NOTE
[facility] will provide home oxygen. Pt to call them at [phone] when he arrives home today so they can deliver the concentrator.      SW notified [facility] P:[phone]         [place], [address] [place] [facility]

## 2021-10-22 NOTE — DISCHARGE SUMMARY
General Medicine Discharge Summary     Patient ID:  Josiah Spivey  79year old  10/30/1950    Admit date: 10/12/2021    Discharge date and time: 10/22/2021     Attending Physician: Nikhil Tobin MD     Primary Care Physician: Shante Atkinson MD     Reas stable      # hypokalemia  -Resolved -replete per protocol     # hyponatremia  -mild  -resolved     Consults: IP CONSULT TO HOSPITALIST  IP CONSULT TO INFECTIOUS DISEASE  NURSING CONSULT TO DIETITIAN  IP CONSULT TO PULMONOLOGY  IP CONSULT TO PHARMACY  IP C by mouth daily. I reconciled current and discharge medications on day of discharge    Follow-up with pcp. cal Blackwell      No orders of the defined types were placed in this encounter.         Total Time Coordinating Care: Greater than 30 minutes

## 2021-10-22 NOTE — PROGRESS NOTES
10/22/21 1153   Mobility   O2 walk?  Yes   SPO2% on Room Air at Rest 92   SPO2% on Oxygen at Rest 94   At rest oxygen flow (liters per minute) 0   SPO2% Ambulation on Room Air 85   SPO2% Ambulation on Oxygen 92   Ambulation oxygen flow (liters per minute

## 2021-10-25 ENCOUNTER — PATIENT OUTREACH (OUTPATIENT)
Dept: CASE MANAGEMENT | Age: 71
End: 2021-10-25

## 2021-10-25 NOTE — PROGRESS NOTES
LM for pt to call [facility] for TCM since discharge. NCM phone number was provided for pt to call back. [facility] contact information was left for the pt to call back as well.

## 2021-10-25 NOTE — PAYOR COMM NOTE
Discharge Notification    Patient Name: Cyndi Barthel  Payor: [employer]  Subscriber #: [id number]  Authorization Number: [id number]  Admit Date/Time: 10/12/2021 11:54 AM  Discharge Date/Time: 10/22/2021 5:55 PM

## 2021-10-26 NOTE — PROGRESS NOTES
Attempted to contact pt for TCM however a busy tone came up twice. Attempted to call the pt and call did not ring, only had a busy tone. NCM to try again at a later time.

## 2021-12-13 ENCOUNTER — TELEPHONE (OUTPATIENT)
Dept: ENDOCRINOLOGY CLINIC | Facility: CLINIC | Age: 71
End: 2021-12-13

## 2022-03-01 NOTE — COVID NURSING ASSESSMENT
Problem: Nutritional:  Goal: Achieve adequate nutritional intake  Description: Patient will consume >50% of meals and supplements.  Outcome: Progressing    PO intake over last few days has been 0-50%, today appetite/PO increasing as pt ate % of breakfast and lunch. SLP saw yesterday, recommended diet upgrade, now on minced and moist diet with thin liquids today.    RD following.      COVID-19 Daily Discharge Readiness-Nursing    O2 Sat at Rest:  SPO2% on Room Air at Rest: 0  %   O2 Sat with Exertion: SPO2% Ambulation on Oxygen: 90 (dropped to 87)  % on Ambulation oxygen flow (liters per minute): 15  liters   Temperature max from last 2